# Patient Record
(demographics unavailable — no encounter records)

---

## 2025-07-11 NOTE — HISTORY OF PRESENT ILLNESS
[Mother] : mother [Normal] : Normal [Brushing teeth] : Brushing teeth [Yes] : Patient goes to dentist yearly [Toothpaste] : Primary Fluoride Source: Toothpaste [Playtime (60 min/d)] : Playtime 60 min a day [< 2 hrs of screen time] : Less than 2 hrs of screen time [No] : No cigarette smoke exposure [Up to date] : Up to date [FreeTextEntry7] : 3 YR [facility].  Patient doing well.  Parental concerns - behavior, potty training.  [de-identified] : Good appetite, eats a variety of foods.  [FreeTextEntry1] : - Coordination of care form reviewed. - Oral health risk assessment tool reviewed. - Discussed 5-2-1-0 questionnaire with parent (and patient, if age appropriate and able to comprehend.)  Concerns and issues addressed if indicated. - Lead level questionnaire not completed by family

## 2025-07-11 NOTE — DEVELOPMENTAL MILESTONES
[Normal Development] : Normal Development [FreeTextEntry1] : [place] reviewed, meets developmental expectations.

## 2025-07-11 NOTE — HISTORY OF PRESENT ILLNESS
[Mother] : mother [Normal] : Normal [Brushing teeth] : Brushing teeth [Yes] : Patient goes to dentist yearly [Toothpaste] : Primary Fluoride Source: Toothpaste [Playtime (60 min/d)] : Playtime 60 min a day [< 2 hrs of screen time] : Less than 2 hrs of screen time [No] : No cigarette smoke exposure [Up to date] : Up to date [FreeTextEntry7] : 3 YR [facility].  Patient doing well.  Parental concerns - behavior, potty training.  [de-identified] : Good appetite, eats a variety of foods.  [FreeTextEntry1] : - Coordination of care form reviewed. - Oral health risk assessment tool reviewed. - Discussed 5-2-1-0 questionnaire with parent (and patient, if age appropriate and able to comprehend.)  Concerns and issues addressed if indicated. - Lead level questionnaire not completed by family

## 2025-07-11 NOTE — DISCUSSION/SUMMARY
[Family Support] : family support [Encouraging Literacy Activities] : encouraging literacy activities [Playing with Peers] : playing with peers [Promoting Physical Activity] : promoting physical activity [Safety] : safety [Mother] : mother [FreeTextEntry1] : - Follow up in 1 year for annual physical or sooner PRN.